# Patient Record
Sex: FEMALE | Race: WHITE | Employment: FULL TIME | URBAN - METROPOLITAN AREA
[De-identification: names, ages, dates, MRNs, and addresses within clinical notes are randomized per-mention and may not be internally consistent; named-entity substitution may affect disease eponyms.]

---

## 2017-09-05 ENCOUNTER — HOSPITAL ENCOUNTER (OUTPATIENT)
Dept: CT IMAGING | Age: 62
Discharge: HOME | End: 2017-09-05
Attending: INTERNAL MEDICINE
Payer: SELF-PAY

## 2017-09-05 DIAGNOSIS — Z91.89 CARDIOVASCULAR RISK FACTOR: ICD-10-CM

## 2017-09-05 PROCEDURE — 75571 CT HRT W/O DYE W/CA TEST: CPT

## 2017-09-05 NOTE — PROGRESS NOTES
Coronary calcium score is 8-good news! Below 100 is good. How to interpret Coronary Calcium Score Results:  1-10: Minimal plaque burden with low risk cardiovascular disease. : Mild plaque burden with moderate risk of cardiovascular disease. 101-400: Moderate plaque burden with high risk of cardiovascular disease. Greater than 401: Extensive plaque burden with a very high cardiovascular  disease risk.

## 2017-11-27 PROBLEM — E66.01 OBESITY, MORBID (HCC): Status: ACTIVE | Noted: 2017-11-27

## 2017-11-27 PROBLEM — R93.1 AGATSTON CORONARY ARTERY CALCIUM SCORE LESS THAN 100: Status: ACTIVE | Noted: 2017-09-01
